# Patient Record
Sex: FEMALE | Race: ASIAN | NOT HISPANIC OR LATINO
[De-identification: names, ages, dates, MRNs, and addresses within clinical notes are randomized per-mention and may not be internally consistent; named-entity substitution may affect disease eponyms.]

---

## 2020-04-26 ENCOUNTER — MESSAGE (OUTPATIENT)
Age: 25
End: 2020-04-26

## 2020-05-07 LAB
SARS-COV-2 IGG SERPL IA-ACNC: 0 INDEX
SARS-COV-2 IGG SERPL QL IA: NEGATIVE

## 2020-12-24 ENCOUNTER — OUTPATIENT (OUTPATIENT)
Dept: OUTPATIENT SERVICES | Facility: HOSPITAL | Age: 25
LOS: 1 days | Discharge: STILL ADMITTED OR ADMINISTRATIVE | End: 2020-12-24
Payer: COMMERCIAL

## 2020-12-30 DIAGNOSIS — F41.1 GENERALIZED ANXIETY DISORDER: ICD-10-CM

## 2021-01-07 ENCOUNTER — TRANSCRIPTION ENCOUNTER (OUTPATIENT)
Age: 26
End: 2021-01-07

## 2021-01-13 PROBLEM — Z00.00 ENCOUNTER FOR PREVENTIVE HEALTH EXAMINATION: Status: ACTIVE | Noted: 2021-01-13

## 2021-01-21 PROCEDURE — 99442: CPT

## 2023-05-19 ENCOUNTER — APPOINTMENT (OUTPATIENT)
Dept: OBGYN | Facility: CLINIC | Age: 28
End: 2023-05-19
Payer: COMMERCIAL

## 2023-05-19 VITALS
DIASTOLIC BLOOD PRESSURE: 71 MMHG | HEIGHT: 60 IN | SYSTOLIC BLOOD PRESSURE: 115 MMHG | BODY MASS INDEX: 23.56 KG/M2 | WEIGHT: 120 LBS

## 2023-05-19 DIAGNOSIS — Z78.9 OTHER SPECIFIED HEALTH STATUS: ICD-10-CM

## 2023-05-19 DIAGNOSIS — Z12.39 ENCOUNTER FOR OTHER SCREENING FOR MALIGNANT NEOPLASM OF BREAST: ICD-10-CM

## 2023-05-19 DIAGNOSIS — Z30.011 ENCOUNTER FOR INITIAL PRESCRIPTION OF CONTRACEPTIVE PILLS: ICD-10-CM

## 2023-05-19 DIAGNOSIS — Z83.3 FAMILY HISTORY OF DIABETES MELLITUS: ICD-10-CM

## 2023-05-19 DIAGNOSIS — Z11.3 ENCOUNTER FOR SCREENING FOR INFECTIONS WITH A PREDOMINANTLY SEXUAL MODE OF TRANSMISSION: ICD-10-CM

## 2023-05-19 DIAGNOSIS — Z82.49 FAMILY HISTORY OF ISCHEMIC HEART DISEASE AND OTHER DISEASES OF THE CIRCULATORY SYSTEM: ICD-10-CM

## 2023-05-19 DIAGNOSIS — R76.8 OTHER SPECIFIED ABNORMAL IMMUNOLOGICAL FINDINGS IN SERUM: ICD-10-CM

## 2023-05-19 DIAGNOSIS — Z12.4 ENCOUNTER FOR SCREENING FOR MALIGNANT NEOPLASM OF CERVIX: ICD-10-CM

## 2023-05-19 PROCEDURE — 99385 PREV VISIT NEW AGE 18-39: CPT

## 2023-05-19 PROCEDURE — 36415 COLL VENOUS BLD VENIPUNCTURE: CPT

## 2023-05-19 RX ORDER — SPIRONOLACTONE 50 MG/1
TABLET ORAL
Refills: 0 | Status: ACTIVE | COMMUNITY

## 2023-05-19 RX ORDER — LACTIC ACID, L-, CITRIC ACID MONOHYDRATE, AND POTASSIUM BITARTRATE 90; 50; 20 MG/5G; MG/5G; MG/5G
1.8-1-0.4 GEL VAGINAL AS DIRECTED
Qty: 30 | Refills: 2 | Status: ACTIVE | COMMUNITY
Start: 2023-05-19 | End: 1900-01-01

## 2023-05-19 RX ORDER — RIMEGEPANT SULFATE 75 MG/75MG
TABLET, ORALLY DISINTEGRATING ORAL
Refills: 0 | Status: ACTIVE | COMMUNITY

## 2023-05-19 RX ORDER — VORTIOXETINE 20 MG/1
TABLET, FILM COATED ORAL
Refills: 0 | Status: ACTIVE | COMMUNITY

## 2023-05-19 RX ORDER — DROSPIRENONE AND ETHINYL ESTRADIOL 0.02-3(28)
3-0.02 KIT ORAL DAILY
Qty: 3 | Refills: 1 | Status: ACTIVE | COMMUNITY
Start: 2023-05-19 | End: 1900-01-01

## 2023-05-19 NOTE — HISTORY OF PRESENT ILLNESS
[FreeTextEntry1] : 28 y/o G0  pt presents today for a new patient routine GYN exam. LMP 05/15/23. Pt would like to discuss birth control options. Saw prior GYN 1 yr ago. Had Mirena IUD removed, because of "bad reaction" (weight gain 20 lbs and joint pain/RA  symptoms), resolved with removal. \par \par Sexually active 1 male partner, no using contraception, + condom use.\par s/p HPV vaccination \par Reports history of abnormal paps- reports last pap was "abnormal" unsure 5/2022\par Menarche age 10, reports monthly menses q28 days\par +Hx ovarian cysts\par c/o vaginal dryness\par NKDA\par MHX: migraines without aura, anxiety, acne\par Denies SHX\par FHX: DM mother, CAD mother\par MEDS: Nurtec ODT, Trintellix 10mg daily, [id number] daily\par Denies toxic exposures, no etoh/tobacco/drug use\par \par Works in [employer]

## 2023-05-19 NOTE — COUNSELING
[Nutrition/ Exercise/ Weight Management] : nutrition, exercise, weight management [Vitamins/Supplements] : vitamins/supplements [Breast Self Exam] : breast self exam [Confidentiality] : confidentiality [Contraception/ Emergency Contraception/ Safe Sexual Practices] : contraception, emergency contraception, safe sexual practices [STD (testing, results, tx)] : STD (testing, results, tx) [Lab Results] : lab results [Medication Management] : medication management

## 2023-05-19 NOTE — PLAN
yesterday
[FreeTextEntry1] : New patient GYN\par -BSE\par -pap done\par -STI screening done\par - erx RANDELL ocp; Oral contraceptive counseling provided to the patient.  Discussed risks and benefits, including thromboembolic events, especially in the setting of smoking, or in the setting of pre-existing medical conditions that predispose to adverse cardiovascular events.  Benign side effects reviewed as well as risk of unintended pregnancy.   Discussion regarding decreased contraceptive efficacy when taken with certain medications or when dosing schedule is erratic.  They do not protect against STI's. All questions answered.\par -Phexxi intravaginal contraceptive prescribed for backup\par \par rto 1 yr or sooner as needed.\par

## 2023-05-19 NOTE — PHYSICAL EXAM
[Chaperone Declined] : Patient declined chaperone [Appropriately responsive] : appropriately responsive [Alert] : alert [No Acute Distress] : no acute distress [No Lymphadenopathy] : no lymphadenopathy [Soft] : soft [Non-tender] : non-tender [Non-distended] : non-distended [No HSM] : No HSM [No Lesions] : no lesions [No Mass] : no mass [Oriented x3] : oriented x3 [Examination Of The Breasts] : a normal appearance [No Masses] : no breast masses were palpable [Labia Majora] : normal [Labia Minora] : normal [Scant] : There was scant vaginal bleeding [Normal] : normal [Uterine Adnexae] : normal

## 2023-05-22 LAB
C TRACH RRNA SPEC QL NAA+PROBE: NOT DETECTED
HBV SURFACE AG SER QL: NONREACTIVE
HCV AB SER QL: NONREACTIVE
HCV S/CO RATIO: 0.17 S/CO
HIV1+2 AB SPEC QL IA.RAPID: NONREACTIVE
N GONORRHOEA RRNA SPEC QL NAA+PROBE: NOT DETECTED
SOURCE TP AMPLIFICATION: NORMAL
T PALLIDUM AB SER QL IA: NEGATIVE

## 2023-10-30 ENCOUNTER — RX RENEWAL (OUTPATIENT)
Age: 28
End: 2023-10-30

## 2024-03-25 ENCOUNTER — APPOINTMENT (OUTPATIENT)
Dept: OBGYN | Facility: CLINIC | Age: 29
End: 2024-03-25

## 2024-04-15 ENCOUNTER — RX RENEWAL (OUTPATIENT)
Age: 29
End: 2024-04-15

## 2024-04-15 RX ORDER — DROSPIRENONE AND ETHINYL ESTRADIOL 0.02-3(28)
3-0.02 KIT ORAL
Qty: 84 | Refills: 0 | Status: ACTIVE | COMMUNITY
Start: 2023-10-30 | End: 1900-01-01

## 2024-07-09 ENCOUNTER — RX RENEWAL (OUTPATIENT)
Age: 29
End: 2024-07-09

## 2024-08-20 ENCOUNTER — RX RENEWAL (OUTPATIENT)
Age: 29
End: 2024-08-20

## 2024-09-17 ENCOUNTER — APPOINTMENT (OUTPATIENT)
Dept: OBGYN | Facility: CLINIC | Age: 29
End: 2024-09-17
Payer: COMMERCIAL

## 2024-09-17 VITALS
WEIGHT: 111 LBS | HEIGHT: 60 IN | BODY MASS INDEX: 21.79 KG/M2 | DIASTOLIC BLOOD PRESSURE: 87 MMHG | SYSTOLIC BLOOD PRESSURE: 119 MMHG

## 2024-09-17 DIAGNOSIS — Z12.4 ENCOUNTER FOR SCREENING FOR MALIGNANT NEOPLASM OF CERVIX: ICD-10-CM

## 2024-09-17 DIAGNOSIS — Z11.3 ENCOUNTER FOR SCREENING FOR INFECTIONS WITH A PREDOMINANTLY SEXUAL MODE OF TRANSMISSION: ICD-10-CM

## 2024-09-17 PROCEDURE — 36415 COLL VENOUS BLD VENIPUNCTURE: CPT

## 2024-09-17 PROCEDURE — 99395 PREV VISIT EST AGE 18-39: CPT

## 2024-09-17 NOTE — HISTORY OF PRESENT ILLNESS
[FreeTextEntry1] : 27 y/o G0 LMP (no menses on Vestura ocp) presents for routine GYN exam.    Had Mirena IUD removed, because of "bad reaction" (weight gain 20 lbs and joint pain/RA  symptoms), resolved with removal.   Sexually active 1 male partner, no using contraception, + condom use. s/p HPV vaccination  Reports history of abnormal paps- reports last pap was "abnormal" unsure 5/2022 Menarche age 10, reports monthly menses q28 days +Hx ovarian cysts c/o vaginal dryness NKDA MHX: migraines without aura, anxiety, acne Denies SHX FHX: DM mother, CAD mother MEDS: Nurtec ODT, Trintellix 10mg daily, [id number] daily Denies toxic exposures, no etoh/tobacco/drug use  Works in [employer] [Patient reported PAP Smear was normal] : Patient reported PAP Smear was normal [PapSmeardate] : 5/19/23 [Patient would like to be screened for STIs] : Patient would like to be screened for STIs

## 2024-09-17 NOTE — PLAN
[FreeTextEntry1] : Routine GYN Exam -Discussed and reviewed importance of monthly BSE -accepts STI testing, importance safe sexual practices discussed -Pap test collected and sent at todays visit -f/u PCP for recommended HCM, vaccinations and CA screening -Refilled Vestura ocp, Oral contraceptive counseling provided to the patient.  Discussed risks and benefits, including thromboembolic events, especially in the setting of smoking, or in the setting of pre-existing medical conditions that predispose to adverse cardiovascular events.  Benign side effects reviewed as well as risk of unintended pregnancy.   Discussion regarding decreased contraceptive efficacy when taken with certain medications or when dosing schedule is erratic.  They do not protect against STI's. All questions answered. rto 1 yr or sooner as needed.

## 2024-09-18 LAB — HIV1+2 AB SPEC QL IA.RAPID: NONREACTIVE

## 2024-09-19 LAB
C TRACH RRNA SPEC QL NAA+PROBE: NOT DETECTED
HBV SURFACE AG SER QL: NONREACTIVE
HCV AB SER QL: NONREACTIVE
HCV S/CO RATIO: 0.13 S/CO
N GONORRHOEA RRNA SPEC QL NAA+PROBE: NOT DETECTED
SOURCE TP AMPLIFICATION: NORMAL
T PALLIDUM AB SER QL IA: NEGATIVE

## 2024-09-23 LAB — CYTOLOGY CVX/VAG DOC THIN PREP: NORMAL

## 2024-11-05 DIAGNOSIS — R10.2 PELVIC AND PERINEAL PAIN: ICD-10-CM

## 2024-11-06 ENCOUNTER — NON-APPOINTMENT (OUTPATIENT)
Age: 29
End: 2024-11-06

## 2025-09-02 ENCOUNTER — RX RENEWAL (OUTPATIENT)
Age: 30
End: 2025-09-02

## 2025-09-02 RX ORDER — DROSPIRENONE AND ETHINYL ESTRADIOL 0.02-3(28)
3-0.02 KIT ORAL
Qty: 84 | Refills: 0 | Status: ACTIVE | COMMUNITY
Start: 2025-09-02 | End: 1900-01-01